# Patient Record
Sex: FEMALE | Race: WHITE | Employment: FULL TIME
[De-identification: names, ages, dates, MRNs, and addresses within clinical notes are randomized per-mention and may not be internally consistent; named-entity substitution may affect disease eponyms.]

---

## 2017-06-03 ENCOUNTER — HEALTH MAINTENANCE LETTER (OUTPATIENT)
Age: 32
End: 2017-06-03

## 2017-12-05 ENCOUNTER — HOSPITAL ENCOUNTER (OUTPATIENT)
Facility: CLINIC | Age: 32
End: 2017-12-05
Admitting: OBSTETRICS & GYNECOLOGY

## 2018-08-20 ENCOUNTER — SURGERY (OUTPATIENT)
Age: 33
End: 2018-08-20

## 2018-08-20 ENCOUNTER — ANESTHESIA EVENT (OUTPATIENT)
Dept: SURGERY | Facility: CLINIC | Age: 33
End: 2018-08-20
Payer: COMMERCIAL

## 2018-08-20 ENCOUNTER — APPOINTMENT (OUTPATIENT)
Dept: CT IMAGING | Facility: CLINIC | Age: 33
End: 2018-08-20
Attending: EMERGENCY MEDICINE
Payer: COMMERCIAL

## 2018-08-20 ENCOUNTER — ANESTHESIA (OUTPATIENT)
Dept: SURGERY | Facility: CLINIC | Age: 33
End: 2018-08-20
Payer: COMMERCIAL

## 2018-08-20 ENCOUNTER — HOSPITAL ENCOUNTER (OUTPATIENT)
Facility: CLINIC | Age: 33
Discharge: HOME | End: 2018-08-20
Attending: EMERGENCY MEDICINE | Admitting: SURGERY
Payer: COMMERCIAL

## 2018-08-20 VITALS
RESPIRATION RATE: 16 BRPM | TEMPERATURE: 98 F | BODY MASS INDEX: 31.36 KG/M2 | DIASTOLIC BLOOD PRESSURE: 79 MMHG | SYSTOLIC BLOOD PRESSURE: 114 MMHG | HEIGHT: 63 IN | OXYGEN SATURATION: 96 % | WEIGHT: 177 LBS

## 2018-08-20 DIAGNOSIS — K35.30 ACUTE APPENDICITIS WITH LOCALIZED PERITONITIS: ICD-10-CM

## 2018-08-20 LAB
ALBUMIN SERPL-MCNC: 3.9 G/DL (ref 3.4–5)
ALBUMIN UR-MCNC: NEGATIVE MG/DL
ALP SERPL-CCNC: 103 U/L (ref 40–150)
ALT SERPL W P-5'-P-CCNC: 22 U/L (ref 0–50)
ANION GAP SERPL CALCULATED.3IONS-SCNC: 10 MMOL/L (ref 3–14)
APPEARANCE UR: ABNORMAL
AST SERPL W P-5'-P-CCNC: 11 U/L (ref 0–45)
BACTERIA #/AREA URNS HPF: ABNORMAL /HPF
BASOPHILS # BLD AUTO: 0 10E9/L (ref 0–0.2)
BASOPHILS NFR BLD AUTO: 0.2 %
BILIRUB SERPL-MCNC: 0.7 MG/DL (ref 0.2–1.3)
BILIRUB UR QL STRIP: NEGATIVE
BUN SERPL-MCNC: 11 MG/DL (ref 7–30)
CALCIUM SERPL-MCNC: 8.7 MG/DL (ref 8.5–10.1)
CHLORIDE SERPL-SCNC: 104 MMOL/L (ref 94–109)
CO2 SERPL-SCNC: 25 MMOL/L (ref 20–32)
COLOR UR AUTO: YELLOW
CREAT SERPL-MCNC: 0.76 MG/DL (ref 0.52–1.04)
DIFFERENTIAL METHOD BLD: ABNORMAL
EOSINOPHIL # BLD AUTO: 0.1 10E9/L (ref 0–0.7)
EOSINOPHIL NFR BLD AUTO: 0.5 %
ERYTHROCYTE [DISTWIDTH] IN BLOOD BY AUTOMATED COUNT: 12.3 % (ref 10–15)
GFR SERPL CREATININE-BSD FRML MDRD: 87 ML/MIN/1.7M2
GLUCOSE SERPL-MCNC: 114 MG/DL (ref 70–99)
GLUCOSE UR STRIP-MCNC: NEGATIVE MG/DL
HCG UR QL: NEGATIVE
HCT VFR BLD AUTO: 37.9 % (ref 35–47)
HGB BLD-MCNC: 13 G/DL (ref 11.7–15.7)
HGB UR QL STRIP: NEGATIVE
IMM GRANULOCYTES # BLD: 0 10E9/L (ref 0–0.4)
IMM GRANULOCYTES NFR BLD: 0.2 %
KETONES UR STRIP-MCNC: NEGATIVE MG/DL
LEUKOCYTE ESTERASE UR QL STRIP: ABNORMAL
LYMPHOCYTES # BLD AUTO: 2.2 10E9/L (ref 0.8–5.3)
LYMPHOCYTES NFR BLD AUTO: 19.4 %
MCH RBC QN AUTO: 30.5 PG (ref 26.5–33)
MCHC RBC AUTO-ENTMCNC: 34.3 G/DL (ref 31.5–36.5)
MCV RBC AUTO: 89 FL (ref 78–100)
MONOCYTES # BLD AUTO: 0.9 10E9/L (ref 0–1.3)
MONOCYTES NFR BLD AUTO: 7.9 %
MUCOUS THREADS #/AREA URNS LPF: PRESENT /LPF
NEUTROPHILS # BLD AUTO: 8 10E9/L (ref 1.6–8.3)
NEUTROPHILS NFR BLD AUTO: 71.8 %
NITRATE UR QL: NEGATIVE
NRBC # BLD AUTO: 0 10*3/UL
NRBC BLD AUTO-RTO: 0 /100
PH UR STRIP: 6.5 PH (ref 5–7)
PLATELET # BLD AUTO: 190 10E9/L (ref 150–450)
POTASSIUM SERPL-SCNC: 3.7 MMOL/L (ref 3.4–5.3)
PROT SERPL-MCNC: 7.7 G/DL (ref 6.8–8.8)
RBC # BLD AUTO: 4.26 10E12/L (ref 3.8–5.2)
RBC #/AREA URNS AUTO: 3 /HPF (ref 0–2)
SODIUM SERPL-SCNC: 139 MMOL/L (ref 133–144)
SOURCE: ABNORMAL
SP GR UR STRIP: 1.01 (ref 1–1.03)
SQUAMOUS #/AREA URNS AUTO: 25 /HPF (ref 0–1)
UROBILINOGEN UR STRIP-MCNC: NORMAL MG/DL (ref 0–2)
WBC # BLD AUTO: 11.1 10E9/L (ref 4–11)
WBC #/AREA URNS AUTO: 29 /HPF (ref 0–5)

## 2018-08-20 PROCEDURE — 71000013 ZZH RECOVERY PHASE 1 LEVEL 1 EA ADDTL HR: Performed by: SURGERY

## 2018-08-20 PROCEDURE — 44970 LAPAROSCOPY APPENDECTOMY: CPT | Performed by: SURGERY

## 2018-08-20 PROCEDURE — 96365 THER/PROPH/DIAG IV INF INIT: CPT | Mod: 59

## 2018-08-20 PROCEDURE — 81025 URINE PREGNANCY TEST: CPT | Performed by: EMERGENCY MEDICINE

## 2018-08-20 PROCEDURE — 25000125 ZZHC RX 250: Performed by: EMERGENCY MEDICINE

## 2018-08-20 PROCEDURE — 25000128 H RX IP 250 OP 636: Performed by: EMERGENCY MEDICINE

## 2018-08-20 PROCEDURE — 80053 COMPREHEN METABOLIC PANEL: CPT | Performed by: EMERGENCY MEDICINE

## 2018-08-20 PROCEDURE — 37000008 ZZH ANESTHESIA TECHNICAL FEE, 1ST 30 MIN: Performed by: SURGERY

## 2018-08-20 PROCEDURE — 27210995 ZZH RX 272: Performed by: SURGERY

## 2018-08-20 PROCEDURE — 44970 LAPAROSCOPY APPENDECTOMY: CPT | Mod: AS | Performed by: PHYSICIAN ASSISTANT

## 2018-08-20 PROCEDURE — 25800025 ZZH RX 258: Performed by: SURGERY

## 2018-08-20 PROCEDURE — 88304 TISSUE EXAM BY PATHOLOGIST: CPT | Performed by: SURGERY

## 2018-08-20 PROCEDURE — 25000125 ZZHC RX 250: Performed by: ANESTHESIOLOGY

## 2018-08-20 PROCEDURE — 25000125 ZZHC RX 250: Performed by: NURSE ANESTHETIST, CERTIFIED REGISTERED

## 2018-08-20 PROCEDURE — 37000009 ZZH ANESTHESIA TECHNICAL FEE, EACH ADDTL 15 MIN: Performed by: SURGERY

## 2018-08-20 PROCEDURE — 25000128 H RX IP 250 OP 636: Performed by: NURSE ANESTHETIST, CERTIFIED REGISTERED

## 2018-08-20 PROCEDURE — 85025 COMPLETE CBC W/AUTO DIFF WBC: CPT | Performed by: EMERGENCY MEDICINE

## 2018-08-20 PROCEDURE — 27210794 ZZH OR GENERAL SUPPLY STERILE: Performed by: SURGERY

## 2018-08-20 PROCEDURE — 96361 HYDRATE IV INFUSION ADD-ON: CPT

## 2018-08-20 PROCEDURE — 40000169 ZZH STATISTIC PRE-PROCEDURE ASSESSMENT I: Performed by: SURGERY

## 2018-08-20 PROCEDURE — 88304 TISSUE EXAM BY PATHOLOGIST: CPT | Mod: 26 | Performed by: SURGERY

## 2018-08-20 PROCEDURE — 25000125 ZZHC RX 250: Performed by: SURGERY

## 2018-08-20 PROCEDURE — 25000132 ZZH RX MED GY IP 250 OP 250 PS 637: Performed by: PHYSICIAN ASSISTANT

## 2018-08-20 PROCEDURE — 99285 EMERGENCY DEPT VISIT HI MDM: CPT | Mod: 25

## 2018-08-20 PROCEDURE — 74177 CT ABD & PELVIS W/CONTRAST: CPT

## 2018-08-20 PROCEDURE — 71000012 ZZH RECOVERY PHASE 1 LEVEL 1 FIRST HR: Performed by: SURGERY

## 2018-08-20 PROCEDURE — 25000566 ZZH SEVOFLURANE, EA 15 MIN: Performed by: SURGERY

## 2018-08-20 PROCEDURE — 36000056 ZZH SURGERY LEVEL 3 1ST 30 MIN: Performed by: SURGERY

## 2018-08-20 PROCEDURE — 87086 URINE CULTURE/COLONY COUNT: CPT | Performed by: EMERGENCY MEDICINE

## 2018-08-20 PROCEDURE — 25000128 H RX IP 250 OP 636: Performed by: ANESTHESIOLOGY

## 2018-08-20 PROCEDURE — 36000058 ZZH SURGERY LEVEL 3 EA 15 ADDTL MIN: Performed by: SURGERY

## 2018-08-20 PROCEDURE — 81001 URINALYSIS AUTO W/SCOPE: CPT | Performed by: EMERGENCY MEDICINE

## 2018-08-20 PROCEDURE — 99220 ZZC INITIAL OBSERVATION CARE,LEVL III: CPT | Mod: 57 | Performed by: SURGERY

## 2018-08-20 RX ORDER — DEXAMETHASONE SODIUM PHOSPHATE 4 MG/ML
4 INJECTION, SOLUTION INTRA-ARTICULAR; INTRALESIONAL; INTRAMUSCULAR; INTRAVENOUS; SOFT TISSUE EVERY 10 MIN PRN
Status: DISCONTINUED | OUTPATIENT
Start: 2018-08-20 | End: 2018-08-20 | Stop reason: HOSPADM

## 2018-08-20 RX ORDER — ALBUTEROL SULFATE 0.83 MG/ML
2.5 SOLUTION RESPIRATORY (INHALATION)
Status: DISCONTINUED | OUTPATIENT
Start: 2018-08-20 | End: 2018-08-20 | Stop reason: HOSPADM

## 2018-08-20 RX ORDER — MAGNESIUM HYDROXIDE 1200 MG/15ML
LIQUID ORAL PRN
Status: DISCONTINUED | OUTPATIENT
Start: 2018-08-20 | End: 2018-08-20 | Stop reason: HOSPADM

## 2018-08-20 RX ORDER — NEOSTIGMINE METHYLSULFATE 1 MG/ML
VIAL (ML) INJECTION PRN
Status: DISCONTINUED | OUTPATIENT
Start: 2018-08-20 | End: 2018-08-20

## 2018-08-20 RX ORDER — MEPERIDINE HYDROCHLORIDE 25 MG/ML
12.5 INJECTION INTRAMUSCULAR; INTRAVENOUS; SUBCUTANEOUS
Status: DISCONTINUED | OUTPATIENT
Start: 2018-08-20 | End: 2018-08-20 | Stop reason: HOSPADM

## 2018-08-20 RX ORDER — KETOROLAC TROMETHAMINE 30 MG/ML
30 INJECTION, SOLUTION INTRAMUSCULAR; INTRAVENOUS EVERY 6 HOURS PRN
Status: DISCONTINUED | OUTPATIENT
Start: 2018-08-20 | End: 2018-08-20 | Stop reason: HOSPADM

## 2018-08-20 RX ORDER — SODIUM CHLORIDE, SODIUM LACTATE, POTASSIUM CHLORIDE, CALCIUM CHLORIDE 600; 310; 30; 20 MG/100ML; MG/100ML; MG/100ML; MG/100ML
INJECTION, SOLUTION INTRAVENOUS CONTINUOUS
Status: DISCONTINUED | OUTPATIENT
Start: 2018-08-20 | End: 2018-08-20 | Stop reason: HOSPADM

## 2018-08-20 RX ORDER — HYDROMORPHONE HYDROCHLORIDE 1 MG/ML
.3-.5 INJECTION, SOLUTION INTRAMUSCULAR; INTRAVENOUS; SUBCUTANEOUS EVERY 10 MIN PRN
Status: DISCONTINUED | OUTPATIENT
Start: 2018-08-20 | End: 2018-08-20 | Stop reason: HOSPADM

## 2018-08-20 RX ORDER — LIDOCAINE HYDROCHLORIDE 20 MG/ML
INJECTION, SOLUTION INFILTRATION; PERINEURAL PRN
Status: DISCONTINUED | OUTPATIENT
Start: 2018-08-20 | End: 2018-08-20

## 2018-08-20 RX ORDER — FENTANYL CITRATE 50 UG/ML
25-50 INJECTION, SOLUTION INTRAMUSCULAR; INTRAVENOUS EVERY 5 MIN PRN
Status: DISCONTINUED | OUTPATIENT
Start: 2018-08-20 | End: 2018-08-20 | Stop reason: HOSPADM

## 2018-08-20 RX ORDER — FENTANYL CITRATE 50 UG/ML
INJECTION, SOLUTION INTRAMUSCULAR; INTRAVENOUS PRN
Status: DISCONTINUED | OUTPATIENT
Start: 2018-08-20 | End: 2018-08-20

## 2018-08-20 RX ORDER — DEXAMETHASONE SODIUM PHOSPHATE 4 MG/ML
INJECTION, SOLUTION INTRA-ARTICULAR; INTRALESIONAL; INTRAMUSCULAR; INTRAVENOUS; SOFT TISSUE PRN
Status: DISCONTINUED | OUTPATIENT
Start: 2018-08-20 | End: 2018-08-20

## 2018-08-20 RX ORDER — HYDROCODONE BITARTRATE AND ACETAMINOPHEN 5; 325 MG/1; MG/1
1-2 TABLET ORAL
Status: COMPLETED | OUTPATIENT
Start: 2018-08-20 | End: 2018-08-20

## 2018-08-20 RX ORDER — ONDANSETRON 2 MG/ML
INJECTION INTRAMUSCULAR; INTRAVENOUS PRN
Status: DISCONTINUED | OUTPATIENT
Start: 2018-08-20 | End: 2018-08-20

## 2018-08-20 RX ORDER — AMOXICILLIN 250 MG
1-2 CAPSULE ORAL 2 TIMES DAILY PRN
Qty: 30 TABLET | Refills: 1 | Status: SHIPPED | OUTPATIENT
Start: 2018-08-20

## 2018-08-20 RX ORDER — NALOXONE HYDROCHLORIDE 0.4 MG/ML
.1-.4 INJECTION, SOLUTION INTRAMUSCULAR; INTRAVENOUS; SUBCUTANEOUS
Status: DISCONTINUED | OUTPATIENT
Start: 2018-08-20 | End: 2018-08-20 | Stop reason: HOSPADM

## 2018-08-20 RX ORDER — ONDANSETRON 4 MG/1
4 TABLET, ORALLY DISINTEGRATING ORAL EVERY 30 MIN PRN
Status: DISCONTINUED | OUTPATIENT
Start: 2018-08-20 | End: 2018-08-20 | Stop reason: HOSPADM

## 2018-08-20 RX ORDER — IOPAMIDOL 755 MG/ML
89 INJECTION, SOLUTION INTRAVASCULAR ONCE
Status: COMPLETED | OUTPATIENT
Start: 2018-08-20 | End: 2018-08-20

## 2018-08-20 RX ORDER — DIMENHYDRINATE 50 MG/ML
12.5 INJECTION, SOLUTION INTRAMUSCULAR; INTRAVENOUS
Status: DISCONTINUED | OUTPATIENT
Start: 2018-08-20 | End: 2018-08-20 | Stop reason: HOSPADM

## 2018-08-20 RX ORDER — ERTAPENEM 1 G/1
1 INJECTION, POWDER, LYOPHILIZED, FOR SOLUTION INTRAMUSCULAR; INTRAVENOUS ONCE
Status: COMPLETED | OUTPATIENT
Start: 2018-08-20 | End: 2018-08-20

## 2018-08-20 RX ORDER — KETOROLAC TROMETHAMINE 30 MG/ML
INJECTION, SOLUTION INTRAMUSCULAR; INTRAVENOUS PRN
Status: DISCONTINUED | OUTPATIENT
Start: 2018-08-20 | End: 2018-08-20

## 2018-08-20 RX ORDER — PROPOFOL 10 MG/ML
INJECTION, EMULSION INTRAVENOUS PRN
Status: DISCONTINUED | OUTPATIENT
Start: 2018-08-20 | End: 2018-08-20

## 2018-08-20 RX ORDER — SODIUM CHLORIDE 9 MG/ML
1000 INJECTION, SOLUTION INTRAVENOUS CONTINUOUS
Status: DISCONTINUED | OUTPATIENT
Start: 2018-08-20 | End: 2018-08-20 | Stop reason: HOSPADM

## 2018-08-20 RX ORDER — SCOLOPAMINE TRANSDERMAL SYSTEM 1 MG/1
1 PATCH, EXTENDED RELEASE TRANSDERMAL
Status: DISCONTINUED | OUTPATIENT
Start: 2018-08-20 | End: 2018-08-20 | Stop reason: HOSPADM

## 2018-08-20 RX ORDER — ONDANSETRON 2 MG/ML
4 INJECTION INTRAMUSCULAR; INTRAVENOUS EVERY 30 MIN PRN
Status: DISCONTINUED | OUTPATIENT
Start: 2018-08-20 | End: 2018-08-20 | Stop reason: HOSPADM

## 2018-08-20 RX ORDER — LORAZEPAM 2 MG/ML
.5-1 INJECTION INTRAMUSCULAR
Status: DISCONTINUED | OUTPATIENT
Start: 2018-08-20 | End: 2018-08-20 | Stop reason: HOSPADM

## 2018-08-20 RX ORDER — HYDROCODONE BITARTRATE AND ACETAMINOPHEN 5; 325 MG/1; MG/1
1-2 TABLET ORAL EVERY 4 HOURS PRN
Qty: 18 TABLET | Refills: 0 | Status: SHIPPED | OUTPATIENT
Start: 2018-08-20

## 2018-08-20 RX ORDER — GLYCOPYRROLATE 0.2 MG/ML
INJECTION, SOLUTION INTRAMUSCULAR; INTRAVENOUS PRN
Status: DISCONTINUED | OUTPATIENT
Start: 2018-08-20 | End: 2018-08-20

## 2018-08-20 RX ADMIN — DEXMEDETOMIDINE HYDROCHLORIDE 12 MCG: 100 INJECTION, SOLUTION INTRAVENOUS at 11:43

## 2018-08-20 RX ADMIN — GLYCOPYRROLATE 0.6 MG: 0.2 INJECTION, SOLUTION INTRAMUSCULAR; INTRAVENOUS at 11:24

## 2018-08-20 RX ADMIN — FENTANYL CITRATE 25 MCG: 50 INJECTION, SOLUTION INTRAMUSCULAR; INTRAVENOUS at 11:15

## 2018-08-20 RX ADMIN — DEXMEDETOMIDINE HYDROCHLORIDE 8 MCG: 100 INJECTION, SOLUTION INTRAVENOUS at 11:05

## 2018-08-20 RX ADMIN — ONDANSETRON 4 MG: 2 INJECTION INTRAMUSCULAR; INTRAVENOUS at 11:21

## 2018-08-20 RX ADMIN — DEXMEDETOMIDINE HYDROCHLORIDE 8 MCG: 100 INJECTION, SOLUTION INTRAVENOUS at 10:47

## 2018-08-20 RX ADMIN — SCOPALAMINE 1 PATCH: 1 PATCH, EXTENDED RELEASE TRANSDERMAL at 10:34

## 2018-08-20 RX ADMIN — ERTAPENEM SODIUM 1 G: 1 INJECTION, POWDER, LYOPHILIZED, FOR SOLUTION INTRAMUSCULAR; INTRAVENOUS at 09:33

## 2018-08-20 RX ADMIN — PROPOFOL 190 MG: 10 INJECTION, EMULSION INTRAVENOUS at 10:51

## 2018-08-20 RX ADMIN — NEOSTIGMINE METHYLSULFATE 4 MG: 1 INJECTION, SOLUTION INTRAVENOUS at 11:24

## 2018-08-20 RX ADMIN — DEXMEDETOMIDINE HYDROCHLORIDE 4 MCG: 100 INJECTION, SOLUTION INTRAVENOUS at 10:58

## 2018-08-20 RX ADMIN — ROCURONIUM BROMIDE 40 MG: 10 INJECTION INTRAVENOUS at 10:51

## 2018-08-20 RX ADMIN — KETOROLAC TROMETHAMINE 30 MG: 30 INJECTION, SOLUTION INTRAMUSCULAR at 11:38

## 2018-08-20 RX ADMIN — BUPIVACAINE HYDROCHLORIDE AND EPINEPHRINE BITARTRATE 31 ML: 5; .005 INJECTION, SOLUTION EPIDURAL; INTRACAUDAL; PERINEURAL at 11:28

## 2018-08-20 RX ADMIN — IOPAMIDOL 89 ML: 755 INJECTION, SOLUTION INTRAVENOUS at 09:05

## 2018-08-20 RX ADMIN — FENTANYL CITRATE 25 MCG: 50 INJECTION, SOLUTION INTRAMUSCULAR; INTRAVENOUS at 11:05

## 2018-08-20 RX ADMIN — SODIUM CHLORIDE, PRESERVATIVE FREE 67 ML: 5 INJECTION INTRAVENOUS at 09:05

## 2018-08-20 RX ADMIN — DEXAMETHASONE SODIUM PHOSPHATE 4 MG: 4 INJECTION, SOLUTION INTRA-ARTICULAR; INTRALESIONAL; INTRAMUSCULAR; INTRAVENOUS; SOFT TISSUE at 10:51

## 2018-08-20 RX ADMIN — SODIUM CHLORIDE, POTASSIUM CHLORIDE, SODIUM LACTATE AND CALCIUM CHLORIDE: 600; 310; 30; 20 INJECTION, SOLUTION INTRAVENOUS at 10:47

## 2018-08-20 RX ADMIN — FENTANYL CITRATE 75 MCG: 50 INJECTION, SOLUTION INTRAMUSCULAR; INTRAVENOUS at 10:51

## 2018-08-20 RX ADMIN — SODIUM CHLORIDE 1000 ML: 9 INJECTION, SOLUTION INTRAVENOUS at 10:09

## 2018-08-20 RX ADMIN — SODIUM CHLORIDE 200 ML: 900 IRRIGANT IRRIGATION at 11:21

## 2018-08-20 RX ADMIN — LIDOCAINE HYDROCHLORIDE 100 MG: 20 INJECTION, SOLUTION INFILTRATION; PERINEURAL at 10:51

## 2018-08-20 RX ADMIN — SODIUM CHLORIDE 1000 ML: 900 IRRIGANT IRRIGATION at 11:05

## 2018-08-20 RX ADMIN — SODIUM CHLORIDE 1000 ML: 9 INJECTION, SOLUTION INTRAVENOUS at 08:29

## 2018-08-20 RX ADMIN — HYDROCODONE BITARTRATE AND ACETAMINOPHEN 1 TABLET: 5; 325 TABLET ORAL at 12:58

## 2018-08-20 ASSESSMENT — ENCOUNTER SYMPTOMS
ABDOMINAL PAIN: 1
VOMITING: 0
DYSURIA: 0
FEVER: 0
NAUSEA: 1
CHILLS: 1
DIARRHEA: 0
CONSTIPATION: 0
APPETITE CHANGE: 1

## 2018-08-20 NOTE — ANESTHESIA PREPROCEDURE EVALUATION
Anesthesia Evaluation     .             ROS/MED HX    ENT/Pulmonary:  - neg pulmonary ROS    (-) sleep apnea   Neurologic:  - neg neurologic ROS     Cardiovascular:  - neg cardiovascular ROS       METS/Exercise Tolerance:     Hematologic:  - neg hematologic  ROS       Musculoskeletal:  - neg musculoskeletal ROS       GI/Hepatic:     (+) appendicitis,      (-) GERD   Renal/Genitourinary:  - ROS Renal section negative       Endo:  - neg endo ROS       Psychiatric:  - neg psychiatric ROS       Infectious Disease:  - neg infectious disease ROS       Malignancy:         Other:                     Physical Exam  Normal systems: dental    Airway   Mallampati: II  TM distance: >3 FB  Neck ROM: full    Dental     Cardiovascular   Rhythm and rate: regular      Pulmonary    breath sounds clear to auscultation                    Anesthesia Plan      History & Physical Review  History and physical reviewed and following examination; no interval change.    ASA Status:  1 emergent.        Plan for General and ETT with Intravenous induction. Maintenance will be Balanced.    PONV prophylaxis:  Ondansetron (or other 5HT-3), Dexamethasone or Solumedrol and Scopolamine patch       Postoperative Care  Postoperative pain management:  Multi-modal analgesia.      Consents  Anesthetic plan, risks, benefits and alternatives discussed with:  Patient..        Procedure: Procedure(s):  LAPAROSCOPIC APPENDECTOMY  Preop diagnosis: Unknown    Allergies   Allergen Reactions     No Known Drug Allergies      No past medical history on file.  Past Surgical History:   Procedure Laterality Date     C NONSPECIFIC PROCEDURE      ?? surgery on urethra or tubes as a child.     Social History   Substance Use Topics     Smoking status: Never Smoker     Smokeless tobacco: Not on file      Comment: FATHER SNOKED IN HOUSE WHEN PATIENT WAS YOUNG     Alcohol use Yes      Comment: 6-8 PER WEEK     Prior to Admission medications    Medication Sig Start Date End  Date Taking? Authorizing Provider   CHOLECALCIFEROL 2000 UNIT OR TABS TWO TABLETS DAILY FOR TWO WEEKS THEN ONE TABLET FOR LOW VITAMIN D 3/24/10   Cortney Bernard MD   norethindrone-ethinyl estradiol (LOESTRIN FE 1/20) 1-20 MG-MCG per tablet Take 1 tablet by mouth daily. 10/17/11   Cortney Bernard MD   VIACTIV 500-500-40 MG-UNT-MCG OR CHEW ONE TABLET THREE TIMES A DAY 3/24/10   Cortney Bernard MD   VITAMIN B-12 2500 MCG SL SUBL ONE TABLET DAILY PLACE UNDER THE TONGUE 3/24/10   Cortney Bernard MD     Current Facility-Administered Medications Ordered in Epic   Medication Dose Route Frequency Last Rate Last Dose     ertapenem (INVanz) 1 g vial to attach to  mL bag  1 g Intravenous Once   1 g at 08/20/18 0933     sodium chloride 0.9% infusion  1,000 mL Intravenous Continuous         Current Outpatient Prescriptions Ordered in Epic   Medication     CHOLECALCIFEROL 2000 UNIT OR TABS     norethindrone-ethinyl estradiol (LOESTRIN FE 1/20) 1-20 MG-MCG per tablet     VIACTIV 500-500-40 MG-UNT-MCG OR CHEW     VITAMIN B-12 2500 MCG SL SUBL       sodium chloride       Wt Readings from Last 1 Encounters:   08/20/18 80.3 kg (177 lb)     Temp Readings from Last 1 Encounters:   08/20/18 36.8  C (98.3  F) (Oral)     BP Readings from Last 6 Encounters:   08/20/18 (!) 135/92   04/05/10 106/68   03/24/10 108/60   02/23/10 128/68   06/28/09 118/78   03/24/09 130/78     Pulse Readings from Last 4 Encounters:   04/05/10 78   03/24/10 80   02/23/10 88   06/28/09 100     Resp Readings from Last 1 Encounters:   08/20/18 18     SpO2 Readings from Last 1 Encounters:   08/20/18 97%     Recent Labs   Lab Test  08/20/18   0820   NA  139   POTASSIUM  3.7   CHLORIDE  104   CO2  25   ANIONGAP  10   GLC  114*   BUN  11   CR  0.76   PAVAN  8.7     Recent Labs   Lab Test  08/20/18   0820   AST  11   ALT  22   ALKPHOS  103   BILITOTAL  0.7     Recent Labs   Lab Test  08/20/18   0820   WBC  11.1*   HGB  13.0   PLT  190     Recent Labs    Lab Test  08/20/18   0823   HCG  Negative     Recent Results (from the past 744 hour(s))   CT Abdomen Pelvis w Contrast    Narrative    CT ABDOMEN AND PELVIS WITH CONTRAST   8/20/2018 9:09 AM     HISTORY: Right lower quadrant abdominal pain.     TECHNIQUE:  CT abdomen and pelvis with 89 mL Isovue-370 IV. Radiation  dose for this scan was reduced using automated exposure control,  adjustment of the mA and/or kV according to patient size, or iterative  reconstruction technique.    COMPARISON: None.    FINDINGS:  Abdomen: There is mild dependent atelectasis at the right lung base.  The heart size is normal. There is a 1.8 cm indeterminant  low-attenuation lesion in the mid spleen. The liver, gallbladder,  pancreas, adrenal glands and kidneys are normal in appearance. There  is no abdominal or pelvic lymph node enlargement.    Pelvis: The appendix is mildly dilated and thick-walled. There is  minimal surrounding inflammation. No perforation or abscess formation.  Bowel otherwise appears normal. There is trace free fluid in the  pelvis. The uterus and adnexa appear normal.      Impression    IMPRESSION:  1. Acute appendicitis. No perforation or abscess formation.  2. Indeterminant splenic lesion which is statistically most likely  cyst or hemangioma.

## 2018-08-20 NOTE — DISCHARGE INSTRUCTIONS
[facility]  Discharge Instructions: Post-Operative Laparoscopic Appendectomy    ACTIVITY    Expect to feel tired after your surgery.  This will gradually resolve.      Take frequent, short walks and increase your activity gradually.      Avoid strenuous physical activity or heavy lifting greater than 15-20 lbs. for 2-3 weeks.  You may climb stairs.    You may drive without restrictions when you are not using any prescription pain medication and feel comfortable in a car.    You may return to work/school when you are comfortable without any prescription pain medication.    WOUND CARE    You may remove your outer dressing or Band-Aids and shower 48 hours after the surgery.  Pat your incisions dry and leave them open to air.  Re-apply dressing (Band-Aids or gauze/tape) as needed for comfort or drainage.    You may have steri-strips (looks like white tape) on your incision.  You may peel off the steri-strips 2 weeks after your surgery if they have not peeled off on their own.     Do not soak your incisions in a tub or pool for 2 weeks.     Do not apply any lotions, creams, or ointments to your incisions.    A ridge under your incisions is normal and will gradually resolve.    DIET    Start with liquids, then gradually resume your regular diet as tolerated.  Avoid heavy, spicy, and greasy meals for 2-3 days.    Drink plenty of fluids to stay hydrated.    PAIN    Expect some tenderness and discomfort at the incision sites.  Use the prescribed pain medication at your discretion.  Expect gradual resolution of your pain over several days.    You may take ibuprofen with food (unless you have been told not to) instead of or in addition to your prescribed pain medication.  If you are taking Norco or Percocet, do not take any additional acetaminophen/APAP/Tylenol.    Do not drink alcohol or drive while you are taking pain medications.    You may apply ice to your incisions in 20 minute  intervals as needed for the next 48 hours.  After that time, consider switching to heat if you prefer.    EXPECTATIONS    Pain medications can cause constipation.  Limit use when possible.  Take over the counter stool softener/stimulant, such as Colace or Senna, 1-2 times a day with plenty of water.  You may take a mild over the counter laxative, such as Miralax or a suppository, as needed.  You make discontinue these medications once you are having regular bowel movements and/or are no longer taking your narcotic pain medication.     You may have shoulder or upper back discomfort due to the gas used in surgery.  This is temporary and should resolve in 48-72 hours.  Short, frequent walks may help with this.    FOLLOW UP    Our office will contact you in approximately 2 weeks to check on your progress and answer any questions you may have.  If you are doing well, you will not need to return for a follow up appointment.  If any concerns are identified over the phone, we will help you make an appointment to see a provider.     If you have not received a phone call, have any questions or concerns, or would like to be seen, please call us at [phone] and ask to speak with our nurse.  We are located at [address] [place] [address].    CALL OUR OFFICE -[phone] IF YOU HAVE:     Chills or fever above 101 F.    Increased redness, warmth, or drainage at your incisions.    Significant bleeding.    Pain not relieved by your pain medication or rest.    Increasing pain after the first 48 hours.    Any other concerns or questions.    Revised January 2018   Same Day Surgery Discharge Instructions for  Sedation and General Anesthesia       It's not unusual to feel dizzy, light-headed or faint for up to 24 hours after surgery or while taking pain medication.  If you have these symptoms: sit for a few minutes before standing and have someone assist you when you get up to walk or use the  bathroom.      You should rest and relax for the next 24 hours. We recommend you make arrangements to have an adult stay with you for at least 24 hours after your discharge.  Avoid hazardous and strenuous activity.      DO NOT DRIVE any vehicle or operate mechanical equipment for 24 hours following the end of your surgery.  Even though you may feel normal, your reactions may be affected by the medication you have received.      Do not drink alcoholic beverages for 24 hours following surgery.       Slowly progress to your regular diet as you feel able. It's not unusual to feel nauseated and/or vomit after receiving anesthesia.  If you develop these symptoms, drink clear liquids (apple juice, ginger ale, broth, 7-up, etc. ) until you feel better.  If your nausea and vomiting persists for 24 hours, please notify your surgeon.        All narcotic pain medications, along with inactivity and anesthesia, can cause constipation. Drinking plenty of liquids and increasing fiber intake will help.      For any questions of a medical nature, call your surgeon.      Do not make important decisions for 24 hours.      If you had general anesthesia, you may have a sore throat for a couple of days related to the breathing tube used during surgery.  You may use Cepacol lozenges to help with this discomfort.  If it worsens or if you develop a fever, contact your surgeon.       If you feel your pain is not well managed with the pain medications prescribed by your surgeon, please contact your surgeon's office to let them know so they can address your concerns.       Today you received Toradol, an antiinflammatory medication similar to Ibuprofen.  You should not take other antiinflammatory medication, such as Ibuprofen, Motrin, Advil, Aleve, Naprosyn, etc until 5:38pm.  Information for Patients Discharging with a Transderm Scopolamine Patch       Dry mouth is a common side effect.    Drowsiness is another common side effect especially  when combined with pain medication.  Please avoid activities that require mental alertness such as driving a car or making important legal decisions.    Since Scopolamine can cause temporary dilation of the pupils and blurred vision if it comes in contact with the eyes; be sure to wash your hands thoroughly with soap and water immediately after handling the patch.   When you remove your patch, please stick it to a tissue or paper towel for disposal.      Remove the patch immediately and contact a physician in the unlikely event that you experience symptoms of acute glaucoma (pain and reddening of the eyes, accompanied by dilated pupils).    Remove the patch if you develop any difficulties urinating.  If you cannot urinate after removing your patch, please notify your surgeon.  Remove the patch 24 hours after surgery.

## 2018-08-20 NOTE — ED TRIAGE NOTES
Pt here with c/o LRQ pain. Starting last evening. Pt states some intermittent nausea but no vomiting. Pt does show rebound pain when area is pressed on.

## 2018-08-20 NOTE — IP AVS SNAPSHOT
[facility] PreOP/Phase II    [address]    [place] [address]    Phone:  [phone]                                       After Visit Summary   8/20/2018    Graciela Ghosh    MRN: [id number]           After Visit Summary Signature Page     I have received my discharge instructions, and my questions have been answered. I have discussed any challenges I see with this plan with the nurse or doctor.    ..........................................................................................................................................  Patient/Patient Representative Signature      ..........................................................................................................................................  Patient Representative Print Name and Relationship to Patient    ..................................................               ................................................  Date                                            Time    ..........................................................................................................................................  Reviewed by Signature/Title    ...................................................              ..............................................  Date                                                            Time

## 2018-08-20 NOTE — OP NOTE
PREOPERATIVE DIAGNOSIS: Acute appendicitis.     POSTOPERATIVE DIAGNOSIS: Acute appendicitis.     PROCEDURE: Laparoscopic appendectomy.     SURGEON: Luis Guy MD     ASSISTANT: Breanna Hunt PA-C    ANESTHESIA: GET.     COMPLICATIONS: None.     BLOOD LOSS: Minimal.     FINDINGS: Acute appendicitis without perforation.     INDICATIONS: Mrs. Ghosh presented with appendicitis and is now presenting for laparoscopic appendectomy. I explained the risks, benefits, complications including but not limited to bleeding, infection, possible need to open, possible postop hematoma, seroma, bowel or bladder injury, all which require additional procedures. The patient did agree and did sign consent.   .   DETAILS OF PROCEDURE: The patient was brought to the operating room per anesthesia, placed in supine position, intubated without difficulty. Surgical timeout was then performed, verifying the correct surgeon, site, procedure and patient. All in the room were in agreement. The patient was prepped and draped in the usual fashion using ChloraPrep. 1% and 0.25% Marcaine were injected to the supraumbilical area. Skin incision was made, carried down to the fascia. The anterior fascia was grasped with 2 Kocher's sharply incised. An open Taras technique was used to gain entry into the abdominal cavity. A camera was inserted and revealed no trocar injuries. The abdomen was insufflated with pressure of 15, which the patient tolerated well. Two 5's were placed in the suprapubic and left lower quadrant under direct visualization. The appendix was found. It was moderately inflamed but not perforated.  The base of the cecum and terminal ileum were normal. A mesenteric window was created at the base of the cecum.  A DEMARIO blue load was fired across this area without issues. Once this was done, the appendix was freed of the lateral wall with cautery. The appendiceal artery was delineated and a DEMARIO white load was fired across this. Bleeding  occurred at the staple line. It was completely stopped with a clip. The appendix was then placed an EndoCatch bag and removed through the umbilical trocar without difficulty. The area was explored. Staple lines were completely intact. There was no bleeding whatsoever. It was irrigated with saline and suctioned.The pelvis showed no acute findings. All trocars were taken out under direct visualization. The fascia was closed with an 0 Vicryl in figure-of-eight fashion. Marcaine 0.25% injected to all the wounds. They were irrigated and closed with 4-0 Monocryl in subcuticular fashion. Steri strips were applied. The patient tolerated the procedure well and was awoken from anesthesia and transferred to PACU in stable condition. All sponge, instrument, and needle counts were correct at the conclusion of the procedure. A physician assistant was needed for camera operation, retraction, and closure.    CORNELIUS BANDA MD     Please CC to PCP

## 2018-08-20 NOTE — BRIEF OP NOTE
[facility] Brief Operative Note    Pre-operative diagnosis: Appendicitis   Post-operative diagnosis Acute appendicitis   Procedure: Procedure(s):  LAPAROSCOPIC APPENDECTOMY - Wound Class: III-Contaminated   Surgeon(s): Surgeon(s) and Role:     * Luis Guy MD - Primary     * Breanna Hunt PA-C - Assisting   Estimated blood loss: 10ml    Specimens:   ID Type Source Tests Collected by Time Destination   A : Appendix Tissue Appendix SURGICAL PATHOLOGY EXAM Luis Guy MD 8/20/2018 11:14 AM       Findings: Appendix inflamed, no evidence of rupture. No immediate complications. See operative report for full details.      Breanna Hunt PA-C  [facility]  [phone]

## 2018-08-20 NOTE — H&P
"[facility]  General Surgery Consultation         Luis Guy    Graciela Ghosh MRN# [id number]   YOB: 1985 Age: 32 year old      Date of Admission:  8/20/2018  Date of Consult: 8/20/2018         Assessment and Plan:   Patient is a 32 year old female with appendicitis    PLAN:  I discussed the pathophysiology of appendicitis including medical and surgical management. I have also personally reviewed the imaging studies that show probable acute appendicitis. I would recommend going forward with laparoscopic appendectomy today. I have also discussed the risks, benefits, complications including but not limited to bleeding, infection, possible injury to the bowel or ureter, possible anastomotic dehiscence, possible post operative abscess, possible postoperative MI, PE, or other anesthetic complications. If one of these complications did arise the patient could require further surgery. The patient thoroughly understood the conversation and will sign consent.       Requesting Physician:      Dr. Mendoza        Chief Complaint:     Chief Complaint   Patient presents with     Abdominal Pain     pt c/o  low abd cramping last night, denies urinary symptoms or discharge          History of Present Illness:   Patient is a 32 year old female who I was asked to see by Dr. Mendoza for evaluation of appendicitis.The patient describes having symptoms for the last 1 days. The pain is mainly located in the RLQ area. The patient rates the pain a 5 out of 10. The pain is exacerbated by activity. The pain is relieved by rest.  Last bowel movement was 0 days ago. The patient does have nausea without vomiting. Patient denies fevers, chills, nausea, vomiting, jaundice, changes in stool or urine, headache, SOB, chest pain.          Physical Exam:   Blood pressure 115/71, temperature 98.8  F (37.1  C), temperature source Temporal, resp. rate 14, height 5' 3\" (1.6 m), weight 177 lb (80.3 kg), SpO2 100 %, " not currently breastfeeding.  177 lbs 0 oz  General: Generally appears well.  Psych: Alert and Oriented.  Normal affect  Neurological: grossly intact  Eyes: Sclera clear  Respiratory:  Lungs clear to ausculation bilaterally with good air excursion  Cardiovascular:  Regular Rate and Rhythm with no murmurs gallops or rubs, normal peripheral pulses  GI: Abdomen Soft Moderate tenderness to palpation RLQ No hernias palpated..  Lymphatic/Hematologic/Immune:  No femoral or cervical lymphadenopathy.  Integumentary:  No rashes       Past Medical History:   None       Past Surgical History:     Past Surgical History:   Procedure Laterality Date     C NONSPECIFIC PROCEDURE      ?? surgery on urethra or tubes as a child.          Current Medications:           scopolamine  1 patch Transdermal Q72H    And     scopolamine   Transdermal Q8H    And     [START ON 8/23/2018] scopolamine   Transdermal Q72H     albuterol, bupivacaine 0.5%/EPI 30 mL + lidocaine 1% 30 mL, dexamethasone, dimenhyDRINATE, fentaNYL, HYDROcodone-acetaminophen, HYDROmorphone, ketorolac, lidocaine (buffered or not buffered), LORazepam, meperidine, naloxone, ondansetron **OR** ondansetron, - MEDICATION INSTRUCTIONS -, prochlorperazine, sodium chloride 0.9% (bag), sodium chloride 0.9% (bottle)       Home Medications:     Prior to Admission medications    Medication Sig Last Dose Taking? Auth Provider   HYDROcodone-acetaminophen (NORCO) 5-325 MG per tablet Take 1-2 tablets by mouth every 4 hours as needed for other (Moderate to Severe Pain)  Yes Breanna Hunt PA-C   senna-docusate (SENOKOT-S;PERICOLACE) 8.6-50 MG per tablet Take 1-2 tablets by mouth 2 times daily as needed for constipation  Yes Breanna Hunt PA-C   CHOLECALCIFEROL 2000 UNIT OR TABS TWO TABLETS DAILY FOR TWO WEEKS THEN ONE TABLET FOR LOW VITAMIN D   Cortney Bernard MD   norethindrone-ethinyl estradiol (LOESTRIN FE 1/20) 1-20 MG-MCG per tablet Take 1 tablet by mouth  daily.   Cortney Bernard MD   VIACTIV 500-500-40 MG-UNT-MCG OR CHEW ONE TABLET THREE TIMES A DAY   Cortney Bernard MD   VITAMIN B-12 2500 MCG SL SUBL ONE TABLET DAILY PLACE UNDER THE TONGUE   Cortney Bernard MD          Allergies:     Allergies   Allergen Reactions     No Known Drug Allergies           Family History:     Family History   Problem Relation Age of Onset     Cancer Father      ESOPHAGEAL CANCER / SMOKER     GASTROINTESTINAL DISEASE Father      GERDS     GASTROINTESTINAL DISEASE Brother      GERDS     Diabetes Paternal Grandfather      Diabetes Maternal Aunt          Social History:   Graciela Ghosh  reports that she has never smoked. She does not have any smokeless tobacco history on file. She reports that she drinks alcohol. She reports that she does not use illicit drugs.        Review of Systems:   The 10 point Review of Systems is negative other than noted in the HPI.       Labs/Imaging   All new lab and imaging data was reviewed.     Luis Guy M.D.  [facility]

## 2018-08-20 NOTE — IP AVS SNAPSHOT
MRN:[id number]                      After Visit Summary   8/20/2018    Graciela Ghosh    MRN: [id number]           Thank you!     Thank you for choosing [facility] for your care. Our goal is always to provide you with excellent care. Hearing back from our patients is one way we can continue to improve our services. Please take a few minutes to complete the written survey that you may receive in the mail after you visit with us. Thank you!        Patient Information     Date Of Birth          1985        About your hospital stay     You were admitted on:  N/A You last received care in the:  [facility] PreOP/Phase II    You were discharged on:  August 20, 2018       Who to Call     For medical emergencies, please call 911.  For non-urgent questions about your medical care, please call your primary care provider or clinic, None  For questions related to your surgery, please call your surgery clinic        Attending Provider     Provider Elvira Rajput MD Emergency Medicine       Primary Care Provider Fax #    Physician No Ref-Primary [phone]      Further instructions from your care team       [facility]  Discharge Instructions: Post-Operative Laparoscopic Appendectomy    ACTIVITY    Expect to feel tired after your surgery.  This will gradually resolve.      Take frequent, short walks and increase your activity gradually.      Avoid strenuous physical activity or heavy lifting greater than 15-20 lbs. for 2-3 weeks.  You may climb stairs.    You may drive without restrictions when you are not using any prescription pain medication and feel comfortable in a car.    You may return to work/school when you are comfortable without any prescription pain medication.    WOUND CARE    You may remove your outer dressing or Band-Aids and shower 48 hours after the surgery.  Pat your incisions dry and leave them open to air.  Re-apply  dressing (Band-Aids or gauze/tape) as needed for comfort or drainage.    You may have steri-strips (looks like white tape) on your incision.  You may peel off the steri-strips 2 weeks after your surgery if they have not peeled off on their own.     Do not soak your incisions in a tub or pool for 2 weeks.     Do not apply any lotions, creams, or ointments to your incisions.    A ridge under your incisions is normal and will gradually resolve.    DIET    Start with liquids, then gradually resume your regular diet as tolerated.  Avoid heavy, spicy, and greasy meals for 2-3 days.    Drink plenty of fluids to stay hydrated.    PAIN    Expect some tenderness and discomfort at the incision sites.  Use the prescribed pain medication at your discretion.  Expect gradual resolution of your pain over several days.    You may take ibuprofen with food (unless you have been told not to) instead of or in addition to your prescribed pain medication.  If you are taking Norco or Percocet, do not take any additional acetaminophen/APAP/Tylenol.    Do not drink alcohol or drive while you are taking pain medications.    You may apply ice to your incisions in 20 minute intervals as needed for the next 48 hours.  After that time, consider switching to heat if you prefer.    EXPECTATIONS    Pain medications can cause constipation.  Limit use when possible.  Take over the counter stool softener/stimulant, such as Colace or Senna, 1-2 times a day with plenty of water.  You may take a mild over the counter laxative, such as Miralax or a suppository, as needed.  You make discontinue these medications once you are having regular bowel movements and/or are no longer taking your narcotic pain medication.     You may have shoulder or upper back discomfort due to the gas used in surgery.  This is temporary and should resolve in 48-72 hours.  Short, frequent walks may help with this.    FOLLOW UP    Our office will contact you in approximately 2 weeks  to check on your progress and answer any questions you may have.  If you are doing well, you will not need to return for a follow up appointment.  If any concerns are identified over the phone, we will help you make an appointment to see a provider.     If you have not received a phone call, have any questions or concerns, or would like to be seen, please call us at [phone] and ask to speak with our nurse.  We are located at [address] [place] [address].    CALL OUR OFFICE [phone] IF YOU HAVE:     Chills or fever above 101 F.    Increased redness, warmth, or drainage at your incisions.    Significant bleeding.    Pain not relieved by your pain medication or rest.    Increasing pain after the first 48 hours.    Any other concerns or questions.    Revised January 2018   Same Day Surgery Discharge Instructions for  Sedation and General Anesthesia       It's not unusual to feel dizzy, light-headed or faint for up to 24 hours after surgery or while taking pain medication.  If you have these symptoms: sit for a few minutes before standing and have someone assist you when you get up to walk or use the bathroom.      You should rest and relax for the next 24 hours. We recommend you make arrangements to have an adult stay with you for at least 24 hours after your discharge.  Avoid hazardous and strenuous activity.      DO NOT DRIVE any vehicle or operate mechanical equipment for 24 hours following the end of your surgery.  Even though you may feel normal, your reactions may be affected by the medication you have received.      Do not drink alcoholic beverages for 24 hours following surgery.       Slowly progress to your regular diet as you feel able. It's not unusual to feel nauseated and/or vomit after receiving anesthesia.  If you develop these symptoms, drink clear liquids (apple juice, ginger ale, broth, 7-up, etc. ) until you feel better.  If your nausea and vomiting persists for 24  hours, please notify your surgeon.        All narcotic pain medications, along with inactivity and anesthesia, can cause constipation. Drinking plenty of liquids and increasing fiber intake will help.      For any questions of a medical nature, call your surgeon.      Do not make important decisions for 24 hours.      If you had general anesthesia, you may have a sore throat for a couple of days related to the breathing tube used during surgery.  You may use Cepacol lozenges to help with this discomfort.  If it worsens or if you develop a fever, contact your surgeon.       If you feel your pain is not well managed with the pain medications prescribed by your surgeon, please contact your surgeon's office to let them know so they can address your concerns.       Today you received Toradol, an antiinflammatory medication similar to Ibuprofen.  You should not take other antiinflammatory medication, such as Ibuprofen, Motrin, Advil, Aleve, Naprosyn, etc until 5:38pm.  Information for Patients Discharging with a Transderm Scopolamine Patch       Dry mouth is a common side effect.    Drowsiness is another common side effect especially when combined with pain medication.  Please avoid activities that require mental alertness such as driving a car or making important legal decisions.    Since Scopolamine can cause temporary dilation of the pupils and blurred vision if it comes in contact with the eyes; be sure to wash your hands thoroughly with soap and water immediately after handling the patch.   When you remove your patch, please stick it to a tissue or paper towel for disposal.      Remove the patch immediately and contact a physician in the unlikely event that you experience symptoms of acute glaucoma (pain and reddening of the eyes, accompanied by dilated pupils).    Remove the patch if you develop any difficulties urinating.  If you cannot urinate after removing your patch, please notify your surgeon.  Remove the  "patch 24 hours after surgery.      Pending Results     Date and Time Order Name Status Description    8/20/2018 1115 Surgical pathology exam In process     8/20/2018 0823 Urine Culture Aerobic Bacterial In process     8/20/2018 0813 CT Abdomen Pelvis w Contrast Preliminary             Admission Information     Date & Time Department Dept. Phone    8/20/2018 [facility] PreOP/Phase [phone]      Your Vitals Were     Blood Pressure Temperature Respirations Height Weight Pulse Oximetry    108/68 98.8  F (37.1  C) (Temporal) 16 1.6 m (5' 3\") 80.3 kg (177 lb) 94%    BMI (Body Mass Index)                   31.35 kg/m2           [facility] Information     [facility] gives you secure access to your electronic health record. If you see a primary care provider, you can also send messages to your care team and make appointments. If you have questions, please call your primary care clinic.  If you do not have a primary care provider, please call [phone] and they will assist you.        Care EveryWhere ID     This is your Care EveryWhere ID. This could be used by other organizations to access your [facility] records  [id number]        Equal Access to Services     PABLO DIAZ : Hadii maurilio Benavides, ernie gallardo, son garcia, lillie weber. So [facility] [phone].    ATENCIÓN: Si habla español, tiene a mederos disposición servicios gratuitos de asistencia lingüística. Sue al [phone].    We comply with applicable federal civil rights laws and [place] laws. We do not discriminate on the basis of race, color, national origin, age, disability, sex, sexual orientation, or gender identity.               Review of your medicines      START taking        Dose / Directions    HYDROcodone-acetaminophen 5-325 MG per tablet   Commonly known as:  NORCO   Used for:  Acute appendicitis with localized peritonitis   Notes to Patient:  1 tablet taken at 12:58        " Dose:  1-2 tablet   Take 1-2 tablets by mouth every 4 hours as needed for other (Moderate to Severe Pain)   Quantity:  18 tablet   Refills:  0       senna-docusate 8.6-50 MG per tablet   Commonly known as:  SENOKOT-S;PERICOLACE   Used for:  Acute appendicitis with localized peritonitis        Dose:  1-2 tablet   Take 1-2 tablets by mouth 2 times daily as needed for constipation   Quantity:  30 tablet   Refills:  1         CONTINUE these medicines which have NOT CHANGED        Dose / Directions    cholecalciferol 2000 units tablet   Used for:  Vitamin D deficiency        TWO TABLETS DAILY FOR TWO WEEKS THEN ONE TABLET FOR LOW VITAMIN D   Quantity:  100 Tab   Refills:  1 YEAR       cyanocobalamin 2500 MCG sublingual tablet   Commonly known as:  VITAMIN B-12   Used for:  Vitamin D deficiency        ONE TABLET DAILY PLACE UNDER THE TONGUE   Refills:  3       norethindrone-ethinyl estradiol 1-20 MG-MCG per tablet   Commonly known as:  LOESTRIN FE 1/20   Used for:  Irregular periods        Dose:  1 tablet   Take 1 tablet by mouth daily.   Quantity:  1 Package   Refills:  0       VIACTIV 500-500-40 MG-UNT-MCG Chew   Used for:  Vitamin D deficiency   Generic drug:  calcium-vitamin D-vitamin K        ONE TABLET THREE TIMES A DAY   Quantity:  100 Tab   Refills:  3            Where to get your medicines      These medications were sent to [facility] - [address]  [address] [facility] [address], [place] [address]     Phone:  [phone]     senna-docusate 8.6-50 MG per tablet         Some of these will need a paper prescription and others can be bought over the counter. Ask your nurse if you have questions.     Bring a paper prescription for each of these medications     HYDROcodone-acetaminophen 5-325 MG per tablet                Protect others around you: Learn how to safely use, store and throw away your medicines at www.disposemymeds.org.        Information about OPIOIDS     PRESCRIPTION  OPIOIDS: WHAT YOU NEED TO KNOW   We gave you an opioid (narcotic) pain medicine. It is important to manage your pain, but opioids are not always the best choice. You should first try all the other options your care team gave you. Take this medicine for as short a time (and as few doses) as possible.    Some activities can increase your pain, such as bandage changes or therapy sessions. It may help to take your pain medicine 30 to 60 minutes before these activities. Reduce your stress by getting enough sleep, working on hobbies you enjoy and practicing relaxation or meditation. Talk to your care team about ways to manage your pain beyond prescription opioids.    These medicines have risks:    DO NOT drive when on new or higher doses of pain medicine. These medicines can affect your alertness and reaction times, and you could be arrested for driving under the influence (DUI). If you need to use opioids long-term, talk to your care team about driving.    DO NOT operate heavy machinery    DO NOT do any other dangerous activities while taking these medicines.    DO NOT drink any alcohol while taking these medicines.     If the opioid prescribed includes acetaminophen, DO NOT take with any other medicines that contain acetaminophen. Read all labels carefully. Look for the word  acetaminophen  or  Tylenol.  Ask your pharmacist if you have questions or are unsure.    You can get addicted to pain medicines, especially if you have a history of addiction (chemical, alcohol or substance dependence). Talk to your care team about ways to reduce this risk.    All opioids tend to cause constipation. Drink plenty of water and eat foods that have a lot of fiber, such as fruits, vegetables, prune juice, apple juice and high-fiber cereal. Take a laxative (Miralax, milk of magnesia, Colace, Senna) if you don t move your bowels at least every other day. Other side effects include upset stomach, sleepiness, dizziness, throwing up,  tolerance (needing more of the medicine to have the same effect), physical dependence and slowed breathing.    Store your pills in a secure place, locked if possible. We will not replace any lost or stolen medicine. If you don t finish your medicine, please throw away (dispose) as directed by your pharmacist. The [place] Pollution Control Agency has more information about safe disposal: https://www.pca.[facility].[place].us/living-green/managing-unwanted-medications             Medication List: This is a list of all your medications and when to take them. Check marks below indicate your daily home schedule. Keep this list as a reference.      Medications           Morning Afternoon Evening Bedtime As Needed    cholecalciferol 2000 units tablet   TWO TABLETS DAILY FOR TWO WEEKS THEN ONE TABLET FOR LOW VITAMIN D                                cyanocobalamin 2500 MCG sublingual tablet   Commonly known as:  VITAMIN B-12   ONE TABLET DAILY PLACE UNDER THE TONGUE                                HYDROcodone-acetaminophen 5-325 MG per tablet   Commonly known as:  NORCO   Take 1-2 tablets by mouth every 4 hours as needed for other (Moderate to Severe Pain)   Last time this was given:  1 tablet on 8/20/2018 12:58 PM   Notes to Patient:  1 tablet taken at 12:58                                norethindrone-ethinyl estradiol 1-20 MG-MCG per tablet   Commonly known as:  LOESTRIN FE 1/20   Take 1 tablet by mouth daily.                                senna-docusate 8.6-50 MG per tablet   Commonly known as:  SENOKOT-S;PERICOLACE   Take 1-2 tablets by mouth 2 times daily as needed for constipation                                VIACTIV 500-500-40 MG-UNT-MCG Chew   ONE TABLET THREE TIMES A DAY   Generic drug:  calcium-vitamin D-vitamin K

## 2018-08-20 NOTE — ANESTHESIA POSTPROCEDURE EVALUATION
Patient: Graciela Ghosh    Procedure(s):  LAPAROSCOPIC APPENDECTOMY - Wound Class: III-Contaminated    Diagnosis:Unknown  Diagnosis Additional Information: No value filed.    Anesthesia Type:  General, ETT    Note:  Anesthesia Post Evaluation    Patient location during evaluation: Bedside  Patient participation: Able to fully participate in evaluation  Level of consciousness: awake and alert  Pain management: adequate  Airway patency: patent  Cardiovascular status: acceptable  Respiratory status: acceptable  Hydration status: acceptable  PONV: none             Last vitals:  Vitals:    08/20/18 1245 08/20/18 1300 08/20/18 1400   BP: 108/68 115/77 114/79   Resp: 16 16 16   Temp:  36.7  C (98  F)    SpO2: 94% 96% 96%         Electronically Signed By: Domenic Lockwood MD  August 20, 2018  4:23 PM

## 2018-08-20 NOTE — ED PROVIDER NOTES
"  History     Chief Complaint:  Abdominal Pain    HPI   Graciela Ghosh is a 32 year old female who presents with abdominal pain. Patient started having abdominal pain last night that she describes as a sharp cramp that is mainly in her right lower abdomen. She states it felt like she had bad gas, but she was able to have a normal bowel movement last night. The pain does radiate to her back some. She states the pain went away last night for a hour or two, however the pain came back. She tried taking some tums without much relief. She state the pain increases with movement and walking. She denies having this pain before. She has some nausea and chills, but denies any fever, vomiting, and dysuria. She did not eat breakfast this morning and she states she does not have an appetite.     Allergies:  No known drug allergies.     Medications:    Cholecalciferol 2000 unit or tabs  Norethindrone-ethinyl estradiol (loestrin fe 1/20) 1-20 mg-mcg per tablet  Viactiv 500-500-40 mg-unt-mcg or chew  Vitamin b-12 2500 mcg sl sublingual      Past Medical History:    Cardiovascular screening    Past Surgical History:    Patient denies a history of abdominal surgeries.     Family History:    Cancer  Gastrointestinal disease  Diabetes    Social History:  Marital Status:   Tobacco Use: No  Alcohol Use: Yes     Review of Systems   Constitutional: Positive for appetite change and chills. Negative for fever.   Gastrointestinal: Positive for abdominal pain and nausea. Negative for constipation, diarrhea and vomiting.   Genitourinary: Negative for dysuria.   All other systems reviewed and are negative.    Physical Exam   First Vitals:  BP: (!) 135/92 (Simultaneous filing. User may not have seen previous data.)  Heart Rate: 120  Temp: 98.3  F (36.8  C)  Resp: 18  Height: 160 cm (5' 3\")  Weight: 80.3 kg (177 lb)  SpO2: 97 % (Simultaneous filing. User may not have seen previous data.)      Physical Exam  Nursing note and vitals " reviewed.    Constitutional:  Appears well-developed and well-nourished, comfortable.    HENT:    Nose normal.  No discharge.      Oropharynx is clear and moist.  Eyes:    Conjunctivae are normal without injection. No lid droop.     Pupils are equal, round, and reactive to light.      Right eye exhibits no discharge. Left eye exhibits no discharge.      No scleral icterus.  Lymph:  No enlarged or tender cervical or submandibular lymph nodes.   Cardiovascular:  Tachycardic rate, regular rhythm with normal S1 and S2.      Normal heart sounds and peripheral pulses 2+ and equal.       No murmur or mayank.  Pulmonary:  Effort normal and breath sounds clear to auscultation bilaterally        No respiratory distress.  No stridor.     No wheezes. No rales.     GI:    Soft. No distension and no mass. No flank pain.  No HSM.     Diffuse lower abdominal pain, maximum pain over RLQ with rebound and guarding over Mcburney's point.  Musculoskeletal:  Normal range of motion. No extremity deformity.     No edema and no tenderness.  No cyanosis.                                      Neck supple, no midline spinal tenderness.   Neurological:   Alert and oriented. No cranial nerve deficit, no facial droop.     Exhibits good muscle tone. Coordination normal.      GCS eye subscore is 4. GCS verbal subscore is 5.      GCS motor subscore is 6.   Skin:    Skin is warm and dry. No rash noted. No diaphoresis.      No erythema. No pallor.  No lesions.  Psychiatric:   Behavior is normal. Appropriate mood and affect.     Judgment and thought content normal.       Emergency Department Course     Imaging:  Radiographic findings were communicated with the patient who voiced understanding of the findings.  CT abdomen pelvis w contrast:  1. Acute appendicitis. No perforation or abscess formation.  2. Indeterminant splenic lesion which is statistically most likely  cyst or hemangioma. Per Radiology read.     Laboratory:  UA: Leukocyte Esterase  moderate (A), o/w WNL  HCG: Negative   CBC:  WBC 11.1 high, HGB 13.0, , otherwise WNL  CMP: Glucose 114 high, o/w WNL. (Creatinine 0.76)    Interventions:  0829: NS 1L IV  0905: Isovue 89mL IV  0933: Invanz 1g IV    Emergency Department Course:  Nursing notes and vitals reviewed.  I performed an exam of the patient as documented above.   9:19 AM Placed a page for general surgery.  Findings and plan explained to the patient who consents to admission.  9:36 AM I consulted with Dr. Guy of general surgery regarding the patient who will admit the patient to a surgical bed for further monitoring, evaluation, and treatment.      Impression & Plan      Medical Decision Making:  Graciela Ghosh is a 32 year old female who presents with abdominal pain and the CT scan confirms appendicitis.  White blood cell count is elevated 11.1, pregnancy test is negative and UA is normal.  This is consistent with her clinical exam.  There is no evidence of rupture or abscess at this time.  IV antibiotics started in the Emergency Department. The case was discussed with the on-call surgeon and the patient will be going to the operating room.  An inpatient bed has been arranged for after surgery.  Patient is hemodynamically stable in ED.  Questions were answered.  IV Invanz given in the ER.    Diagnosis:    ICD-10-CM    1. Acute appendicitis with localized peritonitis K35.3        Disposition:  Admitted to Dr Guy and plan surgery today.  Antibiotics given in the ER.  N.p.o.    I, Bradley Aasen, am serving as a scribe on 8/20/2018 at 8:00 AM to personally document services performed by Elvira Mendoza MD based on my observations and the provider's statements to me.        Elvira Mendoza MD  08/20/18 3488

## 2018-08-20 NOTE — ANESTHESIA CARE TRANSFER NOTE
Patient: Graciela Ghosh    Procedure(s):  LAPAROSCOPIC APPENDECTOMY - Wound Class: III-Contaminated    Diagnosis: Unknown  Diagnosis Additional Information: No value filed.    Anesthesia Type:   General, ETT     Note:  Airway :Face Mask  Patient transferred to:PACU  Comments: Neuromuscular blockade reversed after TOF 4/4, spontaneous respirations, adequate tidal volumes, followed commands to voice, oropharynx suctioned with soft flexible catheter, extubated atraumatically, extubated with suction, airway patent after extubation.  Oxygen via facemask at 6 liters per minute to PACU. Oxygen tubing connected to wall O2 in PACU, SpO2, NiBP, and EKG monitors and alarms on and functioning, Shoaib Hugger warmer connected to patient gown, report on patient's clinical status given to PACU RN, RN questions answered. Handoff Report: Identifed the Patient, Identified the Reponsible Provider, Reviewed the pertinent medical history, Discussed the surgical course, Reviewed Intra-OP anesthesia mangement and issues during anesthesia, Set expectations for post-procedure period and Allowed opportunity for questions and acknowledgement of understanding      Vitals: (Last set prior to Anesthesia Care Transfer)    CRNA VITALS  8/20/2018 1109 - 8/20/2018 1146      8/20/2018             Pulse: 124    SpO2: 100 %    Resp Rate (set): 10                Electronically Signed By: KRYSTIN Walters CRNA  August 20, 2018  11:46 AM

## 2018-08-21 LAB — COPATH REPORT: NORMAL

## 2018-08-22 ENCOUNTER — TELEPHONE (OUTPATIENT)
Dept: SURGERY | Facility: CLINIC | Age: 33
End: 2018-08-22

## 2018-08-22 LAB
BACTERIA SPEC CULT: NORMAL
SPECIMEN SOURCE: NORMAL

## 2018-08-22 NOTE — TELEPHONE ENCOUNTER
Post Surgical Follow Up Call -     Attempted to call patient to discuss any concerns since surgery.  Unable to leave message d/t consent to communicate not on file.    Jillian Quiroz RN

## 2018-08-30 ENCOUNTER — TELEPHONE (OUTPATIENT)
Dept: SURGERY | Facility: CLINIC | Age: 33
End: 2018-08-30

## 2018-08-30 NOTE — TELEPHONE ENCOUNTER
[facility]  Post op call note - LAPAROSCOPIC APPENDECTOMY  August 30, 2018     Graciela Ghosh was called for an update regarding her recovery.  She underwent a laparoscopic appendectomy by Dr. Guy on 8/20/18.  Today she tells me she is doing well and denies any complaints.  She currently does not need any pain medications.  She is eating a normal diet and her bowels are regular.  The patient states she is slowly resuming normal activity.  She states her wounds are healing well and the steri strips are off.  She denies any erythema or drainage at her wounds.  The patient denies fever/chills, n/v/d, abdominal pain, changes in urination or BM, or wound concerns.     The pathology revealed acute appendicitis with periappendicitis.  This was discussed with the patient.  She was instructed to continue to keep wounds clean.  She was advised to advance her activity as tolerated with no heavy lifting > 20 lbs or strenuous exercise x 1 more week. The patient states all of her questions were answered and she understands our discussion.  She agrees to follow up as needed and to call our office with any concerns.      Breanna Hunt PA-C  [facility]  [phone]          Please route or send letter to:  Primary Care Provider (PCP)

## 2019-07-01 ENCOUNTER — HOSPITAL ENCOUNTER (OUTPATIENT)
Facility: CLINIC | Age: 34
End: 2019-07-01
Admitting: OBSTETRICS & GYNECOLOGY
Payer: COMMERCIAL

## 2019-09-29 ENCOUNTER — HEALTH MAINTENANCE LETTER (OUTPATIENT)
Age: 34
End: 2019-09-29

## 2021-01-15 ENCOUNTER — HEALTH MAINTENANCE LETTER (OUTPATIENT)
Age: 36
End: 2021-01-15

## 2021-10-24 ENCOUNTER — HEALTH MAINTENANCE LETTER (OUTPATIENT)
Age: 36
End: 2021-10-24

## 2022-02-13 ENCOUNTER — HEALTH MAINTENANCE LETTER (OUTPATIENT)
Age: 37
End: 2022-02-13

## 2022-10-15 ENCOUNTER — HEALTH MAINTENANCE LETTER (OUTPATIENT)
Age: 37
End: 2022-10-15

## 2023-03-26 ENCOUNTER — HEALTH MAINTENANCE LETTER (OUTPATIENT)
Age: 38
End: 2023-03-26

## (undated) DEVICE — SU VICRYL 0 UR-6 27" J603H

## (undated) DEVICE — CLIP APPLIER ENDO 5MM M/L LIGAMAX EL5ML

## (undated) DEVICE — SOL WATER IRRIG 1000ML BOTTLE 2F7114

## (undated) DEVICE — STPL POWERED ECHELON 45MM PSEE45A

## (undated) DEVICE — ENDO TROCAR FIRST ENTRY KII FIOS Z-THRD 05X100MM CTF03

## (undated) DEVICE — STPL RELOAD REG TISSUE ECHELON 45 X 3.6MM BLUE GST45B

## (undated) DEVICE — PREP CHLORAPREP 26ML TINTED ORANGE  260815

## (undated) DEVICE — ESU HOLDER LAP INST DISP PURPLE LONG 330MM H-PRO-330

## (undated) DEVICE — SYR 10ML FINGER CONTROL W/O NDL 309695

## (undated) DEVICE — NDL 22GA 1.5"

## (undated) DEVICE — GLOVE PROTEXIS W/NEU-THERA 7.5  2D73TE75

## (undated) DEVICE — PACK LAP CHOLE SLC15LCFSD

## (undated) DEVICE — GLOVE GAMMEX DERMAPRENE ULTRA SZ 8 LF 8516

## (undated) DEVICE — SOL NACL 0.9% INJ 1000ML BAG 2B1324X

## (undated) DEVICE — ENDO POUCH UNIV RETRIEVAL SYSTEM INZII 10MM CD001

## (undated) DEVICE — ENDO TROCAR FIRST ENTRY KII FIOS Z-THRD 12X100MM CTF73

## (undated) DEVICE — SUCTION IRR STRYKERFLOW II W/TIP 250-070-520

## (undated) DEVICE — Device

## (undated) DEVICE — SU MONOCRYL 4-0 PS-2 18" UND Y496G

## (undated) DEVICE — SUCTION CANISTER MEDIVAC LINER 3000ML W/LID 65651-530

## (undated) DEVICE — LINEN TOWEL PACK X5 5464

## (undated) DEVICE — SU VICRYL 3-0 SH 27" J316H

## (undated) RX ORDER — DEXAMETHASONE SODIUM PHOSPHATE 4 MG/ML
INJECTION, SOLUTION INTRA-ARTICULAR; INTRALESIONAL; INTRAMUSCULAR; INTRAVENOUS; SOFT TISSUE
Status: DISPENSED
Start: 2018-08-20

## (undated) RX ORDER — ONDANSETRON 2 MG/ML
INJECTION INTRAMUSCULAR; INTRAVENOUS
Status: DISPENSED
Start: 2018-08-20

## (undated) RX ORDER — FENTANYL CITRATE 50 UG/ML
INJECTION, SOLUTION INTRAMUSCULAR; INTRAVENOUS
Status: DISPENSED
Start: 2018-08-20

## (undated) RX ORDER — LIDOCAINE HYDROCHLORIDE 10 MG/ML
INJECTION, SOLUTION EPIDURAL; INFILTRATION; INTRACAUDAL; PERINEURAL
Status: DISPENSED
Start: 2018-08-20

## (undated) RX ORDER — LIDOCAINE HYDROCHLORIDE 20 MG/ML
INJECTION, SOLUTION EPIDURAL; INFILTRATION; INTRACAUDAL; PERINEURAL
Status: DISPENSED
Start: 2018-08-20

## (undated) RX ORDER — BUPIVACAINE HYDROCHLORIDE AND EPINEPHRINE 5; 5 MG/ML; UG/ML
INJECTION, SOLUTION EPIDURAL; INTRACAUDAL; PERINEURAL
Status: DISPENSED
Start: 2018-08-20

## (undated) RX ORDER — GLYCOPYRROLATE 0.2 MG/ML
INJECTION, SOLUTION INTRAMUSCULAR; INTRAVENOUS
Status: DISPENSED
Start: 2018-08-20

## (undated) RX ORDER — SCOLOPAMINE TRANSDERMAL SYSTEM 1 MG/1
PATCH, EXTENDED RELEASE TRANSDERMAL
Status: DISPENSED
Start: 2018-08-20

## (undated) RX ORDER — HYDROCODONE BITARTRATE AND ACETAMINOPHEN 5; 325 MG/1; MG/1
TABLET ORAL
Status: DISPENSED
Start: 2018-08-20

## (undated) RX ORDER — NEOSTIGMINE METHYLSULFATE 1 MG/ML
VIAL (ML) INJECTION
Status: DISPENSED
Start: 2018-08-20